# Patient Record
(demographics unavailable — no encounter records)

---

## 2024-11-08 NOTE — PHYSICAL EXAM
[Well-appearing] : well-appearing [Normocephalic] : normocephalic [No dysmorphic facial features] : no dysmorphic facial features [No ocular abnormalities] : no ocular abnormalities [Neck supple] : neck supple [Soft] : soft [No abnormal neurocutaneous stigmata or skin lesions] : no abnormal neurocutaneous stigmata or skin lesions [Straight] : straight [Alert] : alert [Well related, good eye contact] : well related, good eye contact [Single words] : single words [Ambidextrous] : ambidextrous [Reaches for toys and or gives high five] : reaches for toys and or gives high five [Good  bilaterally] : good  bilaterally [No abnormal involuntary movements] : no abnormal involuntary movements [Stands holding on] : stands holding on [2+ biceps] : 2+ biceps [Knee jerks] : knee jerks [Ankle jerks] : ankle jerks [Responds to touch and tickle] : responds to touch and tickle [de-identified] : no resp distress, no retractions  [de-identified] : full ROM in all extremities [de-identified] : grossly intact [de-identified] : slightly increased tone in extremities BLE, decreased axial tone [de-identified] : spontaneous activity in UE>LE, with movements against gravity [de-identified] : no dysmetria in reaching for objects

## 2024-11-08 NOTE — ASSESSMENT
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 Clinically stable, with some developmental progress without concerns for regression. Will repeat MRI brain

## 2024-11-08 NOTE — HISTORY OF PRESENT ILLNESS
[FreeTextEntry1] : Since the last follow up in Jul 2024:   Overall some improvement in development without concerns for regression. Continues at previous , no further instances of falls  Development:  Standing longer with holding on Lowering self to ground carefully Pulling to stand from sitting in chair Army crawl Pushing up into crawling position Using hands and utensils to feeds herself R>L Increased verbal expression and good receptive language  Equipment:  AFOs  Services:  PT 3x/week Ot 2x/week

## 2024-11-08 NOTE — PHYSICAL EXAM
[Well-appearing] : well-appearing [Normocephalic] : normocephalic [No dysmorphic facial features] : no dysmorphic facial features [No ocular abnormalities] : no ocular abnormalities [Neck supple] : neck supple [Soft] : soft [No abnormal neurocutaneous stigmata or skin lesions] : no abnormal neurocutaneous stigmata or skin lesions [Straight] : straight [Alert] : alert [Well related, good eye contact] : well related, good eye contact [Single words] : single words [Ambidextrous] : ambidextrous [Reaches for toys and or gives high five] : reaches for toys and or gives high five [Good  bilaterally] : good  bilaterally [No abnormal involuntary movements] : no abnormal involuntary movements [Stands holding on] : stands holding on [2+ biceps] : 2+ biceps [Knee jerks] : knee jerks [Ankle jerks] : ankle jerks [Responds to touch and tickle] : responds to touch and tickle [de-identified] : no resp distress, no retractions  [de-identified] : full ROM in all extremities [de-identified] : grossly intact [de-identified] : slightly increased tone in extremities BLE, decreased axial tone [de-identified] : spontaneous activity in UE>LE, with movements against gravity [de-identified] : no dysmetria in reaching for objects

## 2024-11-26 NOTE — PHYSICAL EXAM
[FreeTextEntry1] :    PHYSICAL EXAMINATION: General appearance - well developed, well nourished, Mental status - alert  Commands:distractable Respiratory - no wheezing heard CHEST: equal expansion upon breathing in Abdomen - was not checked Skin - no rash Neurological - Modified Timothy Scale: Tone: [ ]mas 1 in plantarflexor  Involuntary movements: actually mild disdiadochokinesia upon reaching objects  Coordination & Balance: occasionally needs Mod A with standing and min A for sitting  truncal ataxia pt shows knee extension upon holding torso active quad activuation and can dorsiflex with tactile stimulation ataxia upon reaching objects  pt is able to show full grasping motion grabing objects pt shows active quad activaion left cortical thumb but opens palms when reaching objects  upon holding torso pt does not do weight bearing but shows reciprocal motion for hip.  Musculoskeletal - Range of Motion: Supple ROM with hip abduction  DF ROM R1 -10 and R2 5 verónica

## 2024-11-26 NOTE — ASSESSMENT
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  it will be semisolid AFO  too premature to consider botox and ROM is still supple no antispasticy medicaiton as of it now since it can causes weakness  pt needs adaptive stroller for sitting balance and transfer balance pt needs adaptive activity chair for sitting balance and community intergration  will monitor progress   FU in four months  i counseled mother that she has active quad and plantarflexor to be stander and the qualifty of ambulation and standing pending and will need to be monitored

## 2024-11-26 NOTE — HISTORY OF PRESENT ILLNESS
[FreeTextEntry1] :   This note was created using Dragon Voice Recognition Software and reviewed to the best of my ability. Sporadic inaccurate translation may have occurred. Please forgive any typographical or grammatical errors, and please contact me to clarify discrepancies or to verify content. Date of visit: 11/26/2024 CHIEF COMPLAINT / IDENTIFICATION: rehab needs     History was obtained from review of EMR, TIFFANY LOPEZ  and the family  23 month old female presenting for evaluation of developmental delays.known to have G6PD defc and colpocephaly shown on MRI  She is not cruising, standing independently, or crawling reciprocally. She has recently (within the last month) started army crawling and can pull herself forward if she has something to lean ont. When seated in a chair (not on the ground) with support, she can pull to stand. Mother reports the patient assumes a cortical thumb position. She has increased tone that fluctuates throughout the day, sometimes presenting as relaxed and other times stiff. Mother reports the patient's foot was dropping, and she was prescribed braces. She wears Rhino braces at night for her hips. The patient is followed by Dr. Lacy Mackey at [facility] for hip dysplasia, reported to be 30% out of socket. She has been using the Rhino brace for six months, with a follow-up appointment last week. The goal is to avoid surgery. The patient does not show signs of pain with diaper changes. She receives physical therapy (PT) with [facility] once a week (newly started last week) and occupational therapy (OT) twice a week through [facility]. The family lives in [place]. The patient has one older  Concerns today include techniques in child's care, maximizing the functions and developmental strategies DEVELOPMENTAL HISTORY/BIRTH HISTORY: Head midline yes Sitting maintains sitting Standing occasionally pull to stand but needs support Walking not yet    Current Functional Status: contact guard to mod A for standing  EQUIPMENT and DME: AFO from [place] and wants to know if she can get stroller PREVIOUS DIAGNOSTIC STUDIES:

## 2024-11-26 NOTE — REVIEW OF SYSTEMS
[Joint Stiffness] : joint stiffness [Difficulty Walking] : difficulty walking [Negative] : Heme/Lymph [Fever] : no fever [Eye Pain] : no eye pain [Earache] : no earache [Chest Pain] : no chest pain [Shortness Of Breath] : no shortness of breath [Abdominal Pain] : no abdominal pain [Dysuria] : no dysuria [Skin Rash] : no skin rash

## 2025-03-08 NOTE — HISTORY OF PRESENT ILLNESS
[FreeTextEntry1] :   This note was created using Dragon Voice Recognition Software and reviewed to the best of my ability. Sporadic inaccurate translation may have occurred. Please forgive any typographical or grammatical errors, and please contact me to clarify discrepancies or to verify content.  CHIEF COMPLAINT / IDENTIFICATION: rehab needs     History was obtained from review of EMR, TIFFANY LOPEZ  and the family  24 month old female presenting for evaluation of developmental delays.known to have G6PD defc and colpocephaly shown on MRI  Now crawling and demonstrating increased activity levels. Reports of verónica knee buckling with and without braces, possibly not sure it wasexacerbated by a recent episode of hand-foot-and-mouth disease. Rhino brace too small, new prescription requested. Hip dysplasia, reportedly 30% bilaterally (per parent report, November measurement). Patient follows at [facility] for orthopaedic care.  Interval history  Concerns today include techniques in child's care, maximizing the functions and developmental strategies DEVELOPMENTAL HISTORY/BIRTH HISTORY: Head midline yes Sitting maintains sitting Standing occasionally pull to stand but needs support Walking not yet    Current Functional Status: contact guard to mod A for standing  EQUIPMENT and DME: AFO from [place] and wants to know if she can get stroller PREVIOUS DIAGNOSTIC STUDIES:

## 2025-03-08 NOTE — PHYSICAL EXAM
[FreeTextEntry1] :    PHYSICAL EXAMINATION: General appearance - well developed, well nourished, Mental status - alert  Commands:distractable Respiratory - no wheezing heard CHEST: equal expansion upon breathing in Abdomen - was not checked Skin - no rash Neurological - Modified Timothy Scale: Tone: [ ]mas 1 in plantarflexor  and hamstring pt needs mod A maintiatning standing and moves lower limgs with crouched and jumping gait position pt can crawl and pt can maintain sitting with no assistnce Musculoskeletal - Range of Motion: Supple ROM with hip abduction  DF ROM R1 -10 and R2 5 verónica Popliteal angle increased with 45 degree bilatearlly

## 2025-03-08 NOTE — ASSESSMENT
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 (due to the nature of prgression has risk to be quad)  at this point this pt is showing jumping gait position and current DAFO 3.5 is not rigid enough to hold her strainght  I spoke to orthotist and we will provide prescription for atnerior shell verónica and more stregthing posterior strap to reduce knee buckling  pt is too little for knee immobizler and not strong for KAFO or HKAFO  follow up in four months  will order rhinobrace for hip suluxation   Due to the patients physiology an off the shelf orthosis will not be sufficient, a custom device is required to be able to control the hip complex and the device is required to be custom for use longer than 6 months.

## 2025-03-08 NOTE — REVIEW OF SYSTEMS
[Fever] : no fever [Eye Pain] : no eye pain [Earache] : no earache [Chest Pain] : no chest pain [Shortness Of Breath] : no shortness of breath [Abdominal Pain] : no abdominal pain [Joint Stiffness] : joint stiffness [Skin Rash] : no skin rash [Negative] : Psychiatric [FreeTextEntry8] : diaper [de-identified] : difficulty walking

## 2025-03-19 NOTE — HISTORY OF PRESENT ILLNESS
[FreeTextEntry1] : New patient who comes to the equipment program to be assessed by our physical therapists and equipment vendors for either equipment and/or orthotic evaluation. Please see scanned note.

## 2025-05-23 NOTE — REVIEW OF SYSTEMS
[Joint Stiffness] : joint stiffness [Fever] : no fever [Eye Pain] : no eye pain [Earache] : no earache [Chest Pain] : no chest pain [Shortness Of Breath] : no shortness of breath [Skin Rash] : no skin rash [Headache] : no headaches [Suicidal] : not suicidal [FreeTextEntry7] : diaper [FreeTextEntry8] : diaper

## 2025-05-23 NOTE — PHYSICAL EXAM
[FreeTextEntry1] :    PHYSICAL EXAMINATION: General appearance - well developed, well nourished, Mental status - alert  Commands:distractable Respiratory - no wheezing heard CHEST: equal expansion upon breathing in Abdomen - was not checked Skin - no rash Neurological - Modified Timothy Scale: Tone: [ ]mas 1 in plantarflexor  and hamstring--->hamstring MAS decreased pt needs min A maintiatning standing and moves lower limgs with crouched and jumping gait position--->not much jumping gait today but more of hip girdle sway   Musculoskeletal - Range of Motion: Supple ROM with hip abduction  DF ROM R1 -10 and R2 5 verónica Popliteal angle increased with 30 degree bilatearlly reduced from last time

## 2025-05-23 NOTE — HISTORY OF PRESENT ILLNESS
[FreeTextEntry1] :   This note was created using Dragon Voice Recognition Software and reviewed to the best of my ability. Sporadic inaccurate translation may have occurred. Please forgive any typographical or grammatical errors, and please contact me to clarify discrepancies or to verify content.  CHIEF COMPLAINT / IDENTIFICATION: rehab needs     History was obtained from review of EMR, TIFFANY LOPEZ  and the family  2yrs old female presenting for evaluation of developmental delays.known to have G6PD defc and colpocephaly shown on MRI  Now crawling and demonstrating increased activity levels. Reports of verónica knee buckling with and without braces, possibly not sure it wasexacerbated by a recent episode of hand-foot-and-mouth disease. Rhino brace too small, new prescription requested. and she is doing rhinobrace twelves hours per day Hip dysplasia, reportedly 30% bilaterally (per parent report, November measurement). Patient follows at [facility] for orthopaedic care. per PT update pt is making great stride and returned to my clinic for equipment eval  the mother wants to know stander vs gait    Concerns today include techniques in child's care, maximizing the functions and developmental strategies DEVELOPMENTAL HISTORY/BIRTH HISTORY: Head midline yes Sitting maintains sitting Standing occasionally pull to stand but needs support Walking not yet    Current Functional Status: contact guard to mod A for standing  EQUIPMENT and DME: AFO from [place] and wants to know if she can get stroller PREVIOUS DIAGNOSTIC STUDIES:

## 2025-05-23 NOTE — ASSESSMENT
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 She wears a Rhino brace 12 hours/day for hip subluxation (currently 30%). She is not currently receiving speech therapy but will begin services at school in September. The family is considering a gait  to further support her mobility.  PLAN:  Orthotics: Continue Rhino brace 12 hours/day. Monitor for skin irritation. Follow up with orthopedist (Dr. Lacy Zhang) as scheduled. Surgery is not indicated at this time given the degree of subluxation and lack of pain. Equipment: Prescribe gait  to improve mobility and promote independent standing and walking.   Therapy: Continue current PT four times per week. Focus on pelvic control, core strengthening, and range of motion. Address hamstring and ankle tightness as needed. Initiate speech therapy in September at school. Continue to pursue private speech therapy if feasible given family's schedule. School: Plan for school attendance at [employer]. [employer] for integrated classroom placement, if desired.  addendum stander is too restrictive and this pt needs more of hip girdle stregnthing too good for stander

## 2025-06-20 NOTE — PHYSICAL EXAM
[Well-appearing] : well-appearing [Normocephalic] : normocephalic [No dysmorphic facial features] : no dysmorphic facial features [No ocular abnormalities] : no ocular abnormalities [Neck supple] : neck supple [Soft] : soft [No abnormal neurocutaneous stigmata or skin lesions] : no abnormal neurocutaneous stigmata or skin lesions [Straight] : straight [Alert] : alert [Well related, good eye contact] : well related, good eye contact [Single words] : single words [Ambidextrous] : ambidextrous [Reaches for toys and or gives high five] : reaches for toys and or gives high five [Good  bilaterally] : good  bilaterally [No abnormal involuntary movements] : no abnormal involuntary movements [Stands holding on] : stands holding on [2+ biceps] : 2+ biceps [Knee jerks] : knee jerks [Ankle jerks] : ankle jerks [Responds to touch and tickle] : responds to touch and tickle [de-identified] : no resp distress, no retractions  [de-identified] : full ROM in all extremities [de-identified] : grossly intact [de-identified] : slightly increased tone in extremities BLE, decreased axial tone [de-identified] : spontaneous activity in UE>LE, with movements against gravity [de-identified] : no dysmetria in reaching for objects

## 2025-06-20 NOTE — HISTORY OF PRESENT ILLNESS
[FreeTextEntry1] : Since the last follow up in Nov 2024:   Overall some improvement in development without concerns for regression. No longer in  in Feb 2025. Planning to start [place]  in Sept 2025 All services will be continued with the additional of ST  Development:  Standing longer with holding on Lowering self to ground carefully Pulling to stand from sitting in chair Crawling on hands and knees Using hands and utensils to feeds herself R>L Good verbal expression and good receptive language  Equipment:  [employer] Adaptive stroller Gait  pending  Services:  PT 4x/week OT 2x/week

## 2025-06-20 NOTE — ASSESSMENT
[FreeTextEntry1] : 2 year old with motor delays and colpocephaly on MRI brain. Clinically stable, with some developmental progress without concerns for regression. Discussed proceeding with [facility], family to think about proceeding with testing. Explained that positive result is unlikely to change plan of care.